# Patient Record
Sex: FEMALE | Race: BLACK OR AFRICAN AMERICAN | Employment: FULL TIME | URBAN - METROPOLITAN AREA
[De-identification: names, ages, dates, MRNs, and addresses within clinical notes are randomized per-mention and may not be internally consistent; named-entity substitution may affect disease eponyms.]

---

## 2024-08-23 ENCOUNTER — APPOINTMENT (OUTPATIENT)
Dept: GENERAL RADIOLOGY | Age: 19
End: 2024-08-23
Attending: PHYSICIAN ASSISTANT

## 2024-08-23 ENCOUNTER — HOSPITAL ENCOUNTER (OUTPATIENT)
Age: 19
Discharge: HOME | End: 2024-08-23

## 2024-08-23 VITALS
OXYGEN SATURATION: 100 % | BODY MASS INDEX: 36.96 KG/M2 | WEIGHT: 230 LBS | RESPIRATION RATE: 16 BRPM | HEART RATE: 64 BPM | TEMPERATURE: 99 F | DIASTOLIC BLOOD PRESSURE: 83 MMHG | HEIGHT: 66 IN | SYSTOLIC BLOOD PRESSURE: 139 MMHG

## 2024-08-23 DIAGNOSIS — R07.81 RIB PAIN ON RIGHT SIDE: Primary | ICD-10-CM

## 2024-08-23 LAB
B-HCG UR QL: NEGATIVE
BILIRUB UR QL STRIP: NEGATIVE
CLARITY UR: CLEAR
COLOR UR: YELLOW
DDIMER WHOLE BLOOD: <200 NG/ML DDU (ref ?–400)
GLUCOSE UR STRIP-MCNC: NEGATIVE MG/DL
HGB UR QL STRIP: NEGATIVE
KETONES UR STRIP-MCNC: NEGATIVE MG/DL
LEUKOCYTE ESTERASE UR QL STRIP: NEGATIVE
NITRITE UR QL STRIP: NEGATIVE
PH UR STRIP: 6 [PH]
PROT UR STRIP-MCNC: NEGATIVE MG/DL
SP GR UR STRIP: >=1.03
UROBILINOGEN UR STRIP-ACNC: <2 MG/DL

## 2024-08-23 PROCEDURE — 71101 X-RAY EXAM UNILAT RIBS/CHEST: CPT | Performed by: PHYSICIAN ASSISTANT

## 2024-08-23 PROCEDURE — 81025 URINE PREGNANCY TEST: CPT | Performed by: PHYSICIAN ASSISTANT

## 2024-08-23 PROCEDURE — 81002 URINALYSIS NONAUTO W/O SCOPE: CPT | Performed by: PHYSICIAN ASSISTANT

## 2024-08-23 PROCEDURE — 99204 OFFICE O/P NEW MOD 45 MIN: CPT | Performed by: PHYSICIAN ASSISTANT

## 2024-08-23 PROCEDURE — 85378 FIBRIN DEGRADE SEMIQUANT: CPT | Performed by: PHYSICIAN ASSISTANT

## 2024-08-23 RX ORDER — IBUPROFEN 600 MG/1
600 TABLET, FILM COATED ORAL EVERY 8 HOURS PRN
Qty: 15 TABLET | Refills: 0 | Status: SHIPPED | OUTPATIENT
Start: 2024-08-23 | End: 2024-08-28

## 2024-08-23 NOTE — ED PROVIDER NOTES
Patient Seen in: [facility]      History     Chief Complaint   Patient presents with    Back Pain     Stated Complaint: right side pain    Subjective:   HPI    18 year old female her for back pain that started yesterday, denied trauma or injury. 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 She stated that her pain is constant and mostly mid right. Laying down and taking a deep breath makes it worse. She didn't take any medications to help with the pain. No other concerns.     No history of PE or DVT.  No recent travel.  No recent procedure.  No leg pain or swelling.  Not on any OCP.    Objective:   History reviewed. No pertinent past medical history.           No pertinent past surgical history.              No pertinent social history.            Review of Systems    Positive for stated Chief Complaint: Back Pain    Other systems are as noted in HPI.  Constitutional and vital signs reviewed.      All other systems reviewed and negative except as noted above.    Physical Exam     ED Triage Vitals [08/23/24 1010]   /83   Pulse 64   Resp 16   Temp 98.6 °F (37 °C)   Temp src Temporal   SpO2 100 %   O2 Device None (Room air)       Current Vitals:   Vital Signs  BP: 139/83  Pulse: 64  Resp: 16  Temp: 98.6 °F (37 °C)  Temp src: Temporal    Oxygen Therapy  SpO2: 100 %  O2 Device: None (Room air)            Physical Exam  Vitals and nursing note reviewed.   Constitutional:       Appearance: She is well-developed.   HENT:      Head: Atraumatic.      Right Ear: External ear normal.      Left Ear: External ear normal.   Eyes:      Conjunctiva/sclera: Conjunctivae normal.   Cardiovascular:      Rate and Rhythm: Normal rate.   Pulmonary:      Effort: Pulmonary effort is normal.   Musculoskeletal:      Cervical back: Normal range of motion and neck supple.   Skin:     General: Skin is warm and dry.      Capillary Refill: Capillary refill takes less than 2 seconds.   Neurological:      Mental  Status: She is alert and oriented to person, place, and time.   Psychiatric:         Mood and Affect: Mood normal.               ED Course     Labs Reviewed   D-DIMER (POC) - Normal   POCT PREGNANCY URINE - Normal   EM POCT URINALYSIS DIPSTICK           XR RIBS WITH CHEST (3 VIEWS), RIGHT  (CPT=71101)    Result Date: 8/23/2024  PROCEDURE:  XR RIBS WITH CHEST (3 VIEWS), RIGHT  (CPT=71101)  TECHNIQUE:  PA Chest and three views of the ribs were obtained  COMPARISON:  None.  INDICATIONS:  right side pain  PATIENT STATED HISTORY: (As transcribed by Technologist)  Right lateral side rib pain.radiated to right flank since yesterday.No injury.    FINDINGS:  LUNGS:  No significant pulmonary parenchymal abnormalities and normal vascularity. CARDIAC:  Normal size cardiac silhouette. MEDIASTINUM:  Normal. PLEURA:  Normal. BONES:  Normal for age.  No rib fracture or lesion. SOFT TISSUES:  Negative.            CONCLUSION:  There is no evidence of active cardiopulmonary disease.  There is no evidence of a rib fracture.   LOCATION:  [facility]     Dictated by (CST): Kevin Swanson MD on 8/23/2024 at 11:57 AM     Finalized by (CST): Kevin Swanson MD on 8/23/2024 at 12:01 PM                 MDM        19 yo female for back pain that has been constant since yesterday with no trauma or injury. Tenderness to right, thoracic. Difficulty taking breath in.     Differential diagnosis includes but not limited to: Muscle strain, PE, pleural effusion, pregnancy, costochondritis, pyelonephritis    UA neg, preg neg.  Dimer is negative, vital signs are stable, unlikely PE  Rib x-ray shows no rib fracture or pneumothorax.  Pain is also reproducible on exam, possible costochondritis or rib contusion.  Will discharge with ibuprofen for pain.  Advise close follow-up with PCP.  All questions answered.                               Medical Decision Making      Disposition and Plan     Clinical Impression:  1. Rib pain on right side          Disposition:  Discharge  8/23/2024 12:03 pm    Follow-up:  [facility] [id number] [phone]              Medications Prescribed:  Discharge Medication List as of 8/23/2024 12:04 PM        START taking these medications    Details   ibuprofen 600 MG Oral Tab Take 1 tablet (600 mg total) by mouth every 8 (eight) hours as needed for Pain., Normal, Disp-15 tablet, R-0

## 2024-08-23 NOTE — ED INITIAL ASSESSMENT (HPI)
Pain to right side that radiates around to her right flank since yesterday. Pain is worse when laying down. Denies UTI symptoms. Denies n/v/d. No fever.

## 2024-10-04 ENCOUNTER — HOSPITAL ENCOUNTER (EMERGENCY)
Age: 19
Discharge: HOME | End: 2024-10-04
Attending: EMERGENCY MEDICINE
Payer: COMMERCIAL

## 2024-10-04 VITALS
WEIGHT: 216.5 LBS | OXYGEN SATURATION: 99 % | SYSTOLIC BLOOD PRESSURE: 125 MMHG | HEART RATE: 94 BPM | HEIGHT: 66 IN | RESPIRATION RATE: 21 BRPM | TEMPERATURE: 98 F | BODY MASS INDEX: 34.79 KG/M2 | DIASTOLIC BLOOD PRESSURE: 76 MMHG

## 2024-10-04 DIAGNOSIS — R11.2 NAUSEA AND VOMITING IN ADULT: ICD-10-CM

## 2024-10-04 DIAGNOSIS — E87.6 HYPOKALEMIA: ICD-10-CM

## 2024-10-04 DIAGNOSIS — N92.0 MENORRHAGIA WITH REGULAR CYCLE: Primary | ICD-10-CM

## 2024-10-04 LAB
ALBUMIN SERPL-MCNC: 3.6 G/DL (ref 3.4–5)
ALBUMIN/GLOB SERPL: 0.9 {RATIO} (ref 1–2)
ALP LIVER SERPL-CCNC: 83 U/L
ALT SERPL-CCNC: 28 U/L
ANION GAP SERPL CALC-SCNC: 7 MMOL/L (ref 0–18)
AST SERPL-CCNC: 19 U/L (ref 15–37)
B-HCG UR QL: NEGATIVE
BASOPHILS # BLD AUTO: 0.05 X10(3) UL (ref 0–0.2)
BASOPHILS NFR BLD AUTO: 0.6 %
BILIRUB SERPL-MCNC: 0.6 MG/DL (ref 0.1–2)
BILIRUB UR QL CFM: NEGATIVE
BUN BLD-MCNC: 7 MG/DL (ref 9–23)
CALCIUM BLD-MCNC: 9.5 MG/DL (ref 8.5–10.1)
CHLORIDE SERPL-SCNC: 102 MMOL/L (ref 98–112)
CO2 SERPL-SCNC: 26 MMOL/L (ref 21–32)
COLOR UR AUTO: YELLOW
CREAT BLD-MCNC: 0.98 MG/DL
EGFRCR SERPLBLD CKD-EPI 2021: 85 ML/MIN/1.73M2 (ref 60–?)
EOSINOPHIL # BLD AUTO: 0.01 X10(3) UL (ref 0–0.7)
EOSINOPHIL NFR BLD AUTO: 0.1 %
ERYTHROCYTE [DISTWIDTH] IN BLOOD BY AUTOMATED COUNT: 13.2 %
GLOBULIN PLAS-MCNC: 3.9 G/DL (ref 2.8–4.4)
GLUCOSE BLD-MCNC: 112 MG/DL (ref 70–99)
GLUCOSE UR STRIP.AUTO-MCNC: NEGATIVE MG/DL
HCT VFR BLD AUTO: 33.2 %
HGB BLD-MCNC: 11.8 G/DL
HYALINE CASTS #/AREA URNS AUTO: PRESENT /LPF
IMM GRANULOCYTES # BLD AUTO: 0.09 X10(3) UL (ref 0–1)
IMM GRANULOCYTES NFR BLD: 1.1 %
KETONES UR STRIP.AUTO-MCNC: >=160 MG/DL
LEUKOCYTE ESTERASE UR QL STRIP.AUTO: NEGATIVE
LIPASE SERPL-CCNC: 46 U/L (ref 12–53)
LYMPHOCYTES # BLD AUTO: 1.55 X10(3) UL (ref 1–4)
LYMPHOCYTES NFR BLD AUTO: 19.4 %
MCH RBC QN AUTO: 28 PG (ref 26–34)
MCHC RBC AUTO-ENTMCNC: 35.5 G/DL (ref 31–37)
MCV RBC AUTO: 78.7 FL
MONOCYTES # BLD AUTO: 0.54 X10(3) UL (ref 0.1–1)
MONOCYTES NFR BLD AUTO: 6.7 %
NEUTROPHILS # BLD AUTO: 5.77 X10 (3) UL (ref 1.5–7.7)
NEUTROPHILS # BLD AUTO: 5.77 X10(3) UL (ref 1.5–7.7)
NEUTROPHILS NFR BLD AUTO: 72.1 %
NITRITE UR QL STRIP.AUTO: NEGATIVE
OSMOLALITY SERPL CALC.SUM OF ELEC: 279 MOSM/KG (ref 275–295)
PH UR STRIP.AUTO: 5.5 [PH] (ref 5–8)
PLATELET # BLD AUTO: 350 10(3)UL (ref 150–450)
POTASSIUM SERPL-SCNC: 3 MMOL/L (ref 3.5–5.1)
PROT SERPL-MCNC: 7.5 G/DL (ref 6.4–8.2)
RBC # BLD AUTO: 4.22 X10(6)UL
RBC #/AREA URNS AUTO: >10 /HPF
SODIUM SERPL-SCNC: 135 MMOL/L (ref 136–145)
SP GR UR STRIP.AUTO: >=1.03 (ref 1–1.03)
UROBILINOGEN UR STRIP.AUTO-MCNC: 1 MG/DL
WBC # BLD AUTO: 8 X10(3) UL (ref 4–11)

## 2024-10-04 PROCEDURE — 81025 URINE PREGNANCY TEST: CPT

## 2024-10-04 PROCEDURE — 87591 N.GONORRHOEAE DNA AMP PROB: CPT | Performed by: EMERGENCY MEDICINE

## 2024-10-04 PROCEDURE — 96374 THER/PROPH/DIAG INJ IV PUSH: CPT

## 2024-10-04 PROCEDURE — 96361 HYDRATE IV INFUSION ADD-ON: CPT

## 2024-10-04 PROCEDURE — 87491 CHLMYD TRACH DNA AMP PROBE: CPT | Performed by: EMERGENCY MEDICINE

## 2024-10-04 PROCEDURE — 81514 NFCT DS BV&VAGINITIS DNA ALG: CPT | Performed by: EMERGENCY MEDICINE

## 2024-10-04 PROCEDURE — 83690 ASSAY OF LIPASE: CPT | Performed by: EMERGENCY MEDICINE

## 2024-10-04 PROCEDURE — 99284 EMERGENCY DEPT VISIT MOD MDM: CPT

## 2024-10-04 PROCEDURE — 85025 COMPLETE CBC W/AUTO DIFF WBC: CPT | Performed by: EMERGENCY MEDICINE

## 2024-10-04 PROCEDURE — 81015 MICROSCOPIC EXAM OF URINE: CPT | Performed by: EMERGENCY MEDICINE

## 2024-10-04 PROCEDURE — 80053 COMPREHEN METABOLIC PANEL: CPT | Performed by: EMERGENCY MEDICINE

## 2024-10-04 PROCEDURE — 81001 URINALYSIS AUTO W/SCOPE: CPT | Performed by: EMERGENCY MEDICINE

## 2024-10-04 RX ORDER — ONDANSETRON 2 MG/ML
4 INJECTION INTRAMUSCULAR; INTRAVENOUS ONCE
Status: COMPLETED | OUTPATIENT
Start: 2024-10-04 | End: 2024-10-04

## 2024-10-04 RX ORDER — ONDANSETRON 4 MG/1
4 TABLET, ORALLY DISINTEGRATING ORAL EVERY 4 HOURS PRN
Qty: 10 TABLET | Refills: 0 | Status: SHIPPED | OUTPATIENT
Start: 2024-10-04

## 2024-10-04 RX ORDER — POTASSIUM CHLORIDE 1500 MG/1
40 TABLET, EXTENDED RELEASE ORAL ONCE
Status: COMPLETED | OUTPATIENT
Start: 2024-10-04 | End: 2024-10-04

## 2024-10-04 RX ORDER — DICYCLOMINE HCL 20 MG
20 TABLET ORAL 4 TIMES DAILY PRN
Qty: 30 TABLET | Refills: 0 | Status: SHIPPED | OUTPATIENT
Start: 2024-10-04 | End: 2024-11-03

## 2024-10-05 LAB
BV BACTERIA DNA VAG QL NAA+PROBE: POSITIVE
C GLABRATA DNA VAG QL NAA+PROBE: NEGATIVE
C KRUSEI DNA VAG QL NAA+PROBE: NEGATIVE
CANDIDA DNA VAG QL NAA+PROBE: NEGATIVE
T VAGINALIS DNA VAG QL NAA+PROBE: NEGATIVE

## 2024-10-05 NOTE — ED PROVIDER NOTES
Patient Seen in: [facility] [place]      History     Chief Complaint   Patient presents with    Nausea/Vomiting/Diarrhea     Pt with vomiting, headache and bilateral lower abdominal pain x 1 week. Denies cough/fever/chills. Was seen at outside ER Sunday, labs were unremarkable. Was seen by OB GYN Monday also with unremarkable labs     Stated Complaint: Vomiting, headache    Subjective:   HPI    18-year-old female presents to the emergency department with 2 different complaints.  She states that she has been having heavy menstrual bleeding for the last 8 days.  She was seen at an outside emergency department and had blood work done as well as an ultrasound.  She then followed up with her OB/GYN the next day and at that time they felt that she had a small ovarian cyst that I thought was physiologic and no further interventions.  She does not believe cultures been taken but she is unsure.  She states her the previous ER physician had done a pelvic but only told her that \"the walls of her vagina were normal\" she was not placed on any medication.  She is not currently on any birth control she states she was last on birth control over a year ago.  She states that she has had issues with heavy menses.  She states this is heavier.  She states that at times she is changing her pad every hour.  No lightheadedness.  Her second complaint is that she has been vomiting.  She has had a poor appetite overall.  No fevers or chills.  No cough cold or congestion.  No other acute complaints    Objective:     History reviewed. No pertinent past medical history.           History reviewed. No pertinent surgical history.             Social History     Socioeconomic History    Marital status: Single   Tobacco Use    Smoking status: Never    Smokeless tobacco: Never   Vaping Use    Vaping status: Never Used   Substance and Sexual Activity    Drug use: Yes     Types: Cannabis     Comment: occassionally     Social  Determinants of Health     Financial Resource Strain: Not at Risk (2024)    Received from [facility]    Financial Resource Strain     Financial Resource Strain: 1   Food Insecurity: Not at Risk (2024)    Received from [facility]    Food Insecurity     Food: 1   Transportation Needs: Not at Risk (2024)    Received from [facility]    Transportation Needs     Transportation: 1   Physical Activity: Not on File (10/6/2022)    Received from [facility]    Physical Activity     Physical Activity: 0   Stress: Not on File (10/6/2022)    Received from [facility]    Stress     Stress: 0   Social Connections: Not on File (2024)    Received from [facility]    Social Connections     Connectedness: 0   Housing Stability: Not on File (10/6/2022)    Received from [facility]    Housing Stability     Housin                  Physical Exam     ED Triage Vitals   BP 10/04/24 1956 138/74   Pulse 10/04/24 1953 115   Resp 10/04/24 1953 18   Temp 10/04/24 1953 99.1 °F (37.3 °C)   Temp src 10/04/24 1953 Oral   SpO2 10/04/24 1953 99 %   O2 Device 10/04/24 1953 None (Room air)       Current Vitals:   Vital Signs  BP: 125/76  Pulse: 94  Resp: 21  Temp: 98.1 °F (36.7 °C)  Temp src: Oral    Oxygen Therapy  SpO2: 99 %  O2 Device: None (Room air)        Physical Exam  Vitals and nursing note reviewed. Chaperone present: Nurse Anahi.   Constitutional:       General: She is not in acute distress.     Appearance: Normal appearance. She is well-developed. She is obese. She is not ill-appearing, toxic-appearing or diaphoretic.   HENT:      Head: Normocephalic and atraumatic.   Cardiovascular:      Rate and Rhythm: Normal rate and regular rhythm.      Pulses: Normal pulses.      Heart sounds: Normal heart sounds.   Pulmonary:      Effort: Pulmonary effort is normal.      Breath sounds: Normal breath sounds. No stridor. No wheezing.   Abdominal:      General: Bowel sounds are normal.      Palpations: Abdomen is soft.      Tenderness: There is no abdominal  tenderness. There is no rebound.   Genitourinary:     General: Normal vulva.      Exam position: Supine.      Labia:         Right: No rash, tenderness, lesion or injury.         Left: No rash, tenderness, lesion or injury.       Vagina: No signs of injury and foreign body. Bleeding present.      Cervix: Cervical bleeding present. No cervical motion tenderness, discharge or friability.      Uterus: Normal.    Musculoskeletal:         General: No tenderness. Normal range of motion.      Cervical back: Normal range of motion and neck supple.   Lymphadenopathy:      Cervical: No cervical adenopathy.   Skin:     General: Skin is warm and dry.      Capillary Refill: Capillary refill takes less than 2 seconds.      Coloration: Skin is not pale.   Neurological:      General: No focal deficit present.      Mental Status: She is alert and oriented to person, place, and time.      Cranial Nerves: No cranial nerve deficit.      Coordination: Coordination normal.            ED Course     Labs Reviewed   COMP METABOLIC PANEL (14) - Abnormal; Notable for the following components:       Result Value    Glucose 112 (*)     Sodium 135 (*)     Potassium 3.0 (*)     BUN 7 (*)     A/G Ratio 0.9 (*)     All other components within normal limits   CBC WITH DIFFERENTIAL WITH PLATELET - Abnormal; Notable for the following components:    HGB 11.8 (*)     HCT 33.2 (*)     MCV 78.7 (*)     All other components within normal limits   URINALYSIS, ROUTINE - Abnormal; Notable for the following components:    Clarity Urine Slightly Cloudy (*)     Ketones Urine >=160 (*)     Blood Urine Large (*)     Protein Urine Trace (*)     Urobilinogen Urine 1.0 (*)     All other components within normal limits   UA MICROSCOPIC ONLY, URINE - Abnormal; Notable for the following components:    RBC Urine >10 (*)     Bacteria Urine Rare (*)     Squamous Epi. Cells Few (*)     Hyaline Casts Present (*)     All other components within normal limits   LIPASE - Normal    ICTOTEST - Normal   POCT PREGNANCY URINE - Normal   VAGINITIS VAGINOSIS PCR PANEL   CHLAMYDIA/GONOCOCCUS, VANESSA                   MDM      Patient had an IV established and baseline blood work obtained.  She is fine to be hypokalemic.  She was given supplemental potassium for this.  She was given IV fluid resuscitation as well as a dose of Zofran.  She had no vomiting while in the emergency department.  Patient did have cultures obtained that are pending.  I reviewed her previous records as described above.  Her hemoglobin has been trending down she is currently at 11.8 but she was closer to 14 on Sunday.  At this time on exam she does have some vaginal bleeding but it is not heavy bleeding.  She had a small clot.  Patient is not pregnant and therefore this is not miscarriage or any abnormality in that regard.  I contacted OB/GYN.  Currently patient is not having very heavy menses and therefore is not felt that she needs to have medications hormonally to stop bleeding.  She will be given Bentyl to help with some lower abdominal cramping.  She will be given Zofran for her nausea.  She was given fluids for her dehydration.  Currently she is improved overall.  She is to follow-up with her OB/GYN or with ours this next week to be certain that she is continuing to have improvement.  She was discharged in good condition                    Medical Decision Making      Disposition and Plan     Clinical Impression:  1. Menorrhagia with regular cycle    2. Nausea and vomiting in adult    3. Hypokalemia         Disposition:  Discharge  10/4/2024 10:55 pm    Follow-up:  Mark Rogers MD  [id number] [address] [place] [address] [id number] [phone]    Schedule an appointment as soon as possible for a visit            Medications Prescribed:  Discharge Medication List as of 10/4/2024 11:00 PM        START taking these medications    Details   dicyclomine 20 MG Oral Tab Take 1 tablet (20 mg total) by mouth 4 (four) times daily as  needed., Normal, Disp-30 tablet, R-0      ondansetron 4 MG Oral Tablet Dispersible Take 1 tablet (4 mg total) by mouth every 4 (four) hours as needed for Nausea., Normal, Disp-10 tablet, R-0                 Supplementary Documentation:

## 2024-10-05 NOTE — ED NOTES
Patient notified of results and treatment plan   RX flagyl 500mg BID x7 days 0RF called into patient [facility] on file under Trinity Newell PA-C

## 2024-10-05 NOTE — ED INITIAL ASSESSMENT (HPI)
To ER for eval of pain just below with umbilicus that started 1 week ago,since her period started. She had heavy vag bleeding and was seen in an ER and told \"everything was ok\". The pt was also checked by her PMD without new findings(other than an ovarian cyst). + nausea/vomiting. No diarrhea.

## 2024-10-07 ENCOUNTER — TELEPHONE (OUTPATIENT)
Dept: OBGYN CLINIC | Facility: CLINIC | Age: 19
End: 2024-10-07

## 2024-10-07 LAB
C TRACH DNA SPEC QL NAA+PROBE: POSITIVE
N GONORRHOEA DNA SPEC QL NAA+PROBE: NEGATIVE

## 2024-10-07 NOTE — TELEPHONE ENCOUNTER
Patient was seen in [facility] on 10/4 and needs to schedule ER follow up visit.  Please contact patient to discuss ER encounter and schedule appt.  Thank you.

## 2024-10-07 NOTE — TELEPHONE ENCOUNTER
Patient is not established with our office. Was seen in ER on 10/4/2024 for heavy menstrual bleeding with nausea, vomiting, and diarrhea.     Called to follow up with patient.  Positive for BV and Chlamydia.   Negative pregnancy test.     Patient still having menstrual bleeding, saturating pads every 2 hours.   Scheduled for OV on 10/14 with Dr. Martin.   ER bleeding and pain precautions discussed, verbalized understanding, all questions answered.

## 2024-10-08 RX ORDER — DOXYCYCLINE 100 MG/1
100 CAPSULE ORAL 2 TIMES DAILY
Qty: 14 CAPSULE | Refills: 0 | Status: SHIPPED | OUTPATIENT
Start: 2024-10-08 | End: 2024-10-15

## 2024-10-08 NOTE — ED NOTES
Pt informed to positive test results and need to start antibiotics and inform partners of test results and need for treament

## 2024-10-15 ENCOUNTER — OFFICE VISIT (OUTPATIENT)
Dept: OBGYN CLINIC | Facility: CLINIC | Age: 19
End: 2024-10-15
Payer: COMMERCIAL

## 2024-10-15 VITALS
SYSTOLIC BLOOD PRESSURE: 116 MMHG | HEART RATE: 69 BPM | DIASTOLIC BLOOD PRESSURE: 74 MMHG | HEIGHT: 66 IN | BODY MASS INDEX: 35.27 KG/M2 | WEIGHT: 219.5 LBS

## 2024-10-15 DIAGNOSIS — N94.6 DYSMENORRHEA: ICD-10-CM

## 2024-10-15 DIAGNOSIS — N83.201 RIGHT OVARIAN CYST: ICD-10-CM

## 2024-10-15 DIAGNOSIS — N92.4 EXCESSIVE BLEEDING IN PREMENOPAUSAL PERIOD: Primary | ICD-10-CM

## 2024-10-15 PROCEDURE — 3078F DIAST BP <80 MM HG: CPT | Performed by: NURSE PRACTITIONER

## 2024-10-15 PROCEDURE — 99203 OFFICE O/P NEW LOW 30 MIN: CPT | Performed by: NURSE PRACTITIONER

## 2024-10-15 PROCEDURE — 3074F SYST BP LT 130 MM HG: CPT | Performed by: NURSE PRACTITIONER

## 2024-10-15 PROCEDURE — 3008F BODY MASS INDEX DOCD: CPT | Performed by: NURSE PRACTITIONER

## 2024-10-15 NOTE — PROGRESS NOTES
Here for new gynecology visit.  18 year old G 0 P 0.  Patient's last menstrual period was 2024 (exact date).    Here for early, prolonged, heavy and painful menses. She also notes she had prolonged nausea and vomiting in this time.    Her typical menstrual cycle length is Q 28-30 days for 3-5 days.  Nothing  for contraception.    Her last normal menses came 9/10/2024    She then started bleeding early, the next cycle started 2024. It ended around 10/9/2024. It was heavy with clots in the beginning. Pelvic ultrasound done in the ER 2024 at [facility] was as follows:    FINDINGS:   Uterus:  Measures 8.1 x 4.3 x 4.8 cm  and anteverted with homogenous echotexture.   Endometrium: The endometrium measures 18 mm with uniform echogenicity.      Right ovary: The right ovary measures 4.3 x 3.6 x 4.2 cm. There is a hypoechoic 3.7 x 3.6 x 2.8 cm structure with lacelike internal septations. Otherwise, the ovary is unremarkable.   Left ovary: The left ovary measures 2.1 x 1.4 x 1.8 cm. No adnexal mass.   Cul-de-sac fluid: None visualized.     She saw her gynecologist for follow up 2024 and numerous labs were done all of which were normal aside from a slightly elevated Hemoglobin A1C of 5.7. They are recommended a repeat pelvic ultrasound in 2-3 months.    She then returned to the ER at [facility] 10/4/2024 for continued bleeding, nausea and vomiting as well as increased lower abdominal pain.    She was positive for bacterial vaginosis and Chlamydia both of which were treated. She was given IV Zofran and fluids.     She has never had al pap smear.     OB Hx:  neg.    Family gyn hx:  neg.   Family breast hx:  neg.    History reviewed. No pertinent past medical history.    History reviewed. No pertinent surgical history.    Medications Ordered Prior to Encounter[1]    OB History    Para Term  AB Living   0 0 0 0 0 0   SAB IAB Ectopic Multiple Live Births   0 0 0 0 0     ROS:    General:  No  wt loss, wt gain, appetite changes.    /74   Pulse 69   Ht 66\"   Wt 219 lb 8 oz (99.6 kg)   LMP 09/27/2024 (Exact Date)   BMI 35.43 kg/m²     VULVA:  No lesions or erythema.  VAGINA:  No lesions, scant white discharge.  CERVIX:  No lesions or CMT.    UTERUS:  Anteflexed and normal size.  ADNEXA:  NO pain or masses.    IMP/PLAN:    1. Excessive bleeding in premenopausal period  Patient would like to observe to see if she has any additional prolonged or heavy menses in the future. She will continue to log her menses    2. Dysmenorrhea  She again would like to see if she has similar symptoms in the future as this was the first time this has happened    3. Right ovarian cyst  She has a follow up ultrasound scheduled with her regular OB G[facility]NE    Discussed safe sex practices, repeat her Gonorrhea and Chlamydia testing to be sure it has resolved.         [1]   No current outpatient medications on file prior to visit.     No current facility-administered medications on file prior to visit.